# Patient Record
Sex: MALE | Race: OTHER | HISPANIC OR LATINO | URBAN - METROPOLITAN AREA
[De-identification: names, ages, dates, MRNs, and addresses within clinical notes are randomized per-mention and may not be internally consistent; named-entity substitution may affect disease eponyms.]

---

## 2021-06-22 ENCOUNTER — EMERGENCY (EMERGENCY)
Facility: HOSPITAL | Age: 31
LOS: 1 days | Discharge: HOME | End: 2021-06-22
Attending: STUDENT IN AN ORGANIZED HEALTH CARE EDUCATION/TRAINING PROGRAM | Admitting: STUDENT IN AN ORGANIZED HEALTH CARE EDUCATION/TRAINING PROGRAM
Payer: MEDICAID

## 2021-06-22 VITALS
SYSTOLIC BLOOD PRESSURE: 146 MMHG | DIASTOLIC BLOOD PRESSURE: 93 MMHG | RESPIRATION RATE: 18 BRPM | HEART RATE: 66 BPM | TEMPERATURE: 99 F | OXYGEN SATURATION: 98 %

## 2021-06-22 PROCEDURE — 99283 EMERGENCY DEPT VISIT LOW MDM: CPT

## 2021-06-22 PROCEDURE — 73562 X-RAY EXAM OF KNEE 3: CPT | Mod: 26,RT

## 2021-06-22 RX ORDER — CYCLOBENZAPRINE HYDROCHLORIDE 10 MG/1
10 TABLET, FILM COATED ORAL ONCE
Refills: 0 | Status: COMPLETED | OUTPATIENT
Start: 2021-06-22 | End: 2021-06-22

## 2021-06-22 RX ORDER — IBUPROFEN 200 MG
1 TABLET ORAL
Qty: 21 | Refills: 0
Start: 2021-06-22 | End: 2021-06-28

## 2021-06-22 RX ORDER — ACETAMINOPHEN 500 MG
650 TABLET ORAL ONCE
Refills: 0 | Status: COMPLETED | OUTPATIENT
Start: 2021-06-22 | End: 2021-06-22

## 2021-06-22 RX ORDER — IBUPROFEN 200 MG
600 TABLET ORAL ONCE
Refills: 0 | Status: COMPLETED | OUTPATIENT
Start: 2021-06-22 | End: 2021-06-22

## 2021-06-22 RX ORDER — CYCLOBENZAPRINE HYDROCHLORIDE 10 MG/1
1 TABLET, FILM COATED ORAL
Qty: 15 | Refills: 0
Start: 2021-06-22 | End: 2021-06-26

## 2021-06-22 RX ADMIN — Medication 600 MILLIGRAM(S): at 14:35

## 2021-06-22 RX ADMIN — CYCLOBENZAPRINE HYDROCHLORIDE 10 MILLIGRAM(S): 10 TABLET, FILM COATED ORAL at 14:37

## 2021-06-22 RX ADMIN — Medication 650 MILLIGRAM(S): at 14:36

## 2021-06-22 NOTE — ED PROVIDER NOTE - CARE PLAN
Principal Discharge DX:	Knee pain, right   Principal Discharge DX:	Knee pain, right  Assessment and plan of treatment:	During your ED visit you were evaluated for right knee and back pain. take motrin 600mg every 8 hours as needed for pain. Take cyclobenzaprine 10mg every 8 hours as needed for muscle spasm. Do not drink alcohol, drive or operate motorized vehicles while taking this medication.  Follow up with  orthopedics within 1 week, a list has been provided to you. Use the knee imobilizer for 2-3 days as needed for knee pain and crutches to minimize weight bearing on right knee. Return to the ED if you exhibit any new, continued or worsening symptoms.

## 2021-06-22 NOTE — ED PROVIDER NOTE - NSFOLLOWUPINSTRUCTIONS_ED_ALL_ED_FT
During your ED visit you were evaluated for right knee and back pain. take motrin 600mg every 8 hours as needed for pain. Take cyclobenzaprine 10mg every 8 hours as needed for muscle spasm. Do not drink alcohol, drive or operate motorized vehicles while taking this medication.  Follow up with  orthopedics within 1 week, a list has been provided to you. Use the knee imobilizer for 2-3 days as needed for knee pain and crutches to minimize weight bearing on right knee. Return to the ED if you exhibit any new, continued or worsening symptoms.

## 2021-06-22 NOTE — ED PROVIDER NOTE - OBJECTIVE STATEMENT
30 y/o M with no PMH p/w right knee and back pain after striking a ball while playing baseball. Pt states he was swinging the back and struck a ball and had sudden onset pain in the right knee and right back pain. Pt denies numbness weakness. States he has worse pain with walking. He denies fever, chills, chest  pain, SOB. Pt states he took some tylenol with improvement  Pain is currently 5/10

## 2021-06-22 NOTE — ED PROVIDER NOTE - NSFOLLOWUPCLINICS_GEN_ALL_ED_FT
[facility] Orthopedic Surgery  Orthopedic Surgery  [address], [address]rd & [address]th [place] [address]  Phone: [phone]  Fax:   Follow Up Time: 4-6 Days

## 2021-06-22 NOTE — ED PROVIDER NOTE - CLINICAL SUMMARY MEDICAL DECISION MAKING FREE TEXT BOX
32 y/o M with no PMH p/w right knee and back pain after striking a ball while playing baseball. Pt states he was swinging the back and struck a ball and had sudden onset pain in the right knee and right back pain. Pt denies numbness weakness, Pt w/ pain in knee worse with extension, likely meniscal injury will place in knee immobilizer , ortho follow up crutches pain medications, xray

## 2021-06-22 NOTE — ED PROVIDER NOTE - PROGRESS NOTE DETAILS
pain improved, placed in knee immobilizer, given crutches and follow up instructions w/ ortho. return precautions given

## 2021-06-22 NOTE — ED PROVIDER NOTE - PHYSICAL EXAMINATION
Gen: Awake, Alert, WD, WN, NAD  Head:  NC/AT  Eyes:  PERRL, EOMI, Conjunctiva pink, lids normal, no scleral icterus  ENT: OP clear, no exudates, moist mucus membranes  Neck: supple, nontender, no meningismus, no JVD, trachea midline  Cardiac/CV:  S1 S2, RRR, no M/G/R  Respiratory/Pulm:  CTAB, good air movement, normal resp effort, no wheezes/stridor/retractions/rales/rhonchi  Gastrointestinal/Abdomen:  Soft, nontender, nondistended, +BS, no rebound/guarding  Back:  no CVAT, no MLT  Ext:  warm, well perfused, moving all extremities spontaneously, no peripheral edema, distal pulses intact right knee pain small right knee effusion   Skin: intact, no rash  Neuro:  AAOx3, sensation intact, motor 5/5 x 4 extremities, pain with walking  able to bear weight speech clear